# Patient Record
Sex: MALE | Race: OTHER | Employment: OTHER | URBAN - METROPOLITAN AREA
[De-identification: names, ages, dates, MRNs, and addresses within clinical notes are randomized per-mention and may not be internally consistent; named-entity substitution may affect disease eponyms.]

---

## 2017-01-04 ENCOUNTER — OFFICE VISIT (OUTPATIENT)
Dept: CARDIOLOGY | Facility: CLINIC | Age: 82
End: 2017-01-04
Payer: MEDICARE

## 2017-01-04 VITALS
HEIGHT: 67 IN | SYSTOLIC BLOOD PRESSURE: 159 MMHG | WEIGHT: 178.81 LBS | BODY MASS INDEX: 28.07 KG/M2 | DIASTOLIC BLOOD PRESSURE: 73 MMHG | HEART RATE: 71 BPM

## 2017-01-04 DIAGNOSIS — N18.4 CKD (CHRONIC KIDNEY DISEASE), STAGE IV: ICD-10-CM

## 2017-01-04 DIAGNOSIS — R06.02 SHORTNESS OF BREATH: Primary | ICD-10-CM

## 2017-01-04 DIAGNOSIS — I10 ESSENTIAL HYPERTENSION: ICD-10-CM

## 2017-01-04 DIAGNOSIS — I25.10 CORONARY ARTERY DISEASE INVOLVING NATIVE CORONARY ARTERY OF NATIVE HEART WITHOUT ANGINA PECTORIS: ICD-10-CM

## 2017-01-04 PROCEDURE — 99214 OFFICE O/P EST MOD 30 MIN: CPT | Mod: S$PBB,,, | Performed by: INTERNAL MEDICINE

## 2017-01-04 PROCEDURE — 99999 PR PBB SHADOW E&M-EST. PATIENT-LVL III: CPT | Mod: PBBFAC,,, | Performed by: INTERNAL MEDICINE

## 2017-01-04 PROCEDURE — 99213 OFFICE O/P EST LOW 20 MIN: CPT | Mod: PBBFAC,PO | Performed by: INTERNAL MEDICINE

## 2017-01-04 RX ORDER — CLONIDINE HYDROCHLORIDE 0.1 MG/1
TABLET, EXTENDED RELEASE ORAL
COMMUNITY
End: 2017-01-04 | Stop reason: DRUGHIGH

## 2017-01-04 RX ORDER — CYPROHEPTADINE HYDROCHLORIDE 4 MG/1
4 TABLET ORAL 2 TIMES DAILY PRN
Status: ON HOLD | COMMUNITY
End: 2019-11-20 | Stop reason: HOSPADM

## 2017-01-04 RX ORDER — CLONIDINE HYDROCHLORIDE 0.1 MG/1
0.1 TABLET ORAL 2 TIMES DAILY
Qty: 60 TABLET | Refills: 11 | Status: SHIPPED | OUTPATIENT
Start: 2017-01-04 | End: 2019-06-12

## 2017-01-04 NOTE — PROGRESS NOTES
Subjective:    Patient ID:  Chung Hubbard Sr. is a 89 y.o. male who presents for follow-up of CAD F/U and Hypertension      HPI Comments: Hiss BP is out of control.    Hypertension   This is a chronic problem. The current episode started more than 1 year ago. The problem has been gradually worsening since onset. Condition status: Partially controlled and spiking. Past treatments include angiotensin blockers, beta blockers, central alpha agonists, calcium channel blockers and diuretics. The current treatment provides moderate improvement.       Review of Systems   All other systems reviewed and are negative.       Objective:    Physical Exam   Constitutional: He is oriented to person, place, and time. He appears well-developed and well-nourished.   HENT:   Head: Normocephalic and atraumatic.   Eyes: Conjunctivae and EOM are normal. Pupils are equal, round, and reactive to light. Right eye exhibits no discharge. Left eye exhibits no discharge. No scleral icterus.   Neck: Normal range of motion. Neck supple. No JVD present. No tracheal deviation present. No thyromegaly present.   Cardiovascular: Normal rate, regular rhythm, intact distal pulses and normal pulses.  PMI is not displaced.    Murmur heard.   Early systolic murmur is present with a grade of 2/6  at the upper right sternal border  Pulses:       Carotid pulses are 2+ on the right side, and 2+ on the left side.       Dorsalis pedis pulses are 2+ on the right side, and 2+ on the left side.        Posterior tibial pulses are 2+ on the right side, and 2+ on the left side.   Pulmonary/Chest: Effort normal and breath sounds normal. No stridor. No respiratory distress. He has no wheezes. He has no rales.   Abdominal: Soft. Bowel sounds are normal. He exhibits no distension. There is no tenderness. There is no rebound and no guarding.   Musculoskeletal: Normal range of motion. He exhibits no edema or tenderness.   Lymphadenopathy:     He has no cervical adenopathy.    Neurological: He is alert and oriented to person, place, and time. He has normal reflexes.   Skin: Skin is warm and dry. No rash noted. No erythema. No pallor.   Psychiatric: He has a normal mood and affect. His behavior is normal. Judgment and thought content normal.         Assessment:       1. Shortness of breath    2. Coronary artery disease involving native coronary artery of native heart without angina pectoris    3. Essential hypertension    4. CKD (chronic kidney disease), stage IV         Plan:       Shortness of breath    Coronary artery disease involving native coronary artery of native heart without angina pectoris    Essential hypertension    CKD (chronic kidney disease), stage IV    Other orders  -     cloNIDine (CATAPRES) 0.1 MG tablet; Take 1 tablet (0.1 mg total) by mouth 2 (two) times daily.  Dispense: 60 tablet; Refill: 11    Will start with clonidine BID and next visit may discontinue metoprolol.

## 2017-03-21 ENCOUNTER — OFFICE VISIT (OUTPATIENT)
Dept: CARDIOLOGY | Facility: CLINIC | Age: 82
End: 2017-03-21
Payer: MEDICARE

## 2017-03-21 VITALS
BODY MASS INDEX: 27.78 KG/M2 | SYSTOLIC BLOOD PRESSURE: 163 MMHG | DIASTOLIC BLOOD PRESSURE: 73 MMHG | HEART RATE: 58 BPM | WEIGHT: 177 LBS | RESPIRATION RATE: 18 BRPM | HEIGHT: 67 IN

## 2017-03-21 DIAGNOSIS — R06.09 DYSPNEA ON EXERTION: ICD-10-CM

## 2017-03-21 DIAGNOSIS — I10 ESSENTIAL HYPERTENSION: Primary | ICD-10-CM

## 2017-03-21 DIAGNOSIS — I25.10 CORONARY ARTERY DISEASE INVOLVING NATIVE CORONARY ARTERY OF NATIVE HEART WITHOUT ANGINA PECTORIS: ICD-10-CM

## 2017-03-21 DIAGNOSIS — N18.4 CKD (CHRONIC KIDNEY DISEASE), STAGE IV: ICD-10-CM

## 2017-03-21 PROCEDURE — 99214 OFFICE O/P EST MOD 30 MIN: CPT | Mod: S$PBB,,, | Performed by: INTERNAL MEDICINE

## 2017-03-21 PROCEDURE — 99999 PR PBB SHADOW E&M-EST. PATIENT-LVL III: CPT | Mod: PBBFAC,,, | Performed by: INTERNAL MEDICINE

## 2017-03-21 PROCEDURE — 99213 OFFICE O/P EST LOW 20 MIN: CPT | Mod: PBBFAC,PO | Performed by: INTERNAL MEDICINE

## 2017-03-21 RX ORDER — ALBUTEROL SULFATE 90 UG/1
2 AEROSOL, METERED RESPIRATORY (INHALATION)
Status: DISCONTINUED | OUTPATIENT
Start: 2017-03-21 | End: 2017-06-05

## 2017-03-21 NOTE — PROGRESS NOTES
Subjective:    Patient ID:  Chung Hubbard Sr. is a 89 y.o. male who presents for follow-up of Hospital Follow Up (HTN, SOB)      HPI Comments: WAs admitted for one day for HBP and URI.      Review of Systems   All other systems reviewed and are negative.       Objective:    Physical Exam   Constitutional: He is oriented to person, place, and time. He appears well-developed and well-nourished.   HENT:   Head: Normocephalic and atraumatic.   Eyes: Conjunctivae and EOM are normal. Pupils are equal, round, and reactive to light. Right eye exhibits no discharge. Left eye exhibits no discharge. No scleral icterus.   Neck: Normal range of motion. Neck supple. No JVD present. No tracheal deviation present. No thyromegaly present.   Cardiovascular: Normal rate, regular rhythm, intact distal pulses and normal pulses.  PMI is not displaced.    Murmur heard.   Early systolic murmur is present with a grade of 2/6  at the upper right sternal border  Pulses:       Carotid pulses are 2+ on the right side, and 2+ on the left side.       Dorsalis pedis pulses are 2+ on the right side, and 2+ on the left side.        Posterior tibial pulses are 2+ on the right side, and 2+ on the left side.   Pulmonary/Chest: Effort normal. No stridor. No respiratory distress. He has no decreased breath sounds. He has no wheezes. He has rhonchi in the left lower field. He has no rales.   Abdominal: Soft. Bowel sounds are normal. He exhibits no distension. There is no tenderness. There is no rebound and no guarding.   Musculoskeletal: Normal range of motion. He exhibits no edema or tenderness.   Lymphadenopathy:     He has no cervical adenopathy.   Neurological: He is alert and oriented to person, place, and time. He has normal reflexes.   Skin: Skin is warm and dry. No rash noted. No erythema. No pallor.   Psychiatric: He has a normal mood and affect. His behavior is normal. Judgment and thought content normal.         Assessment:       1. Essential  hypertension    2. Coronary artery disease involving native coronary artery of native heart without angina pectoris    3. CKD (chronic kidney disease), stage IV    4. Dyspnea on exertion         Plan:       Essential hypertension    Coronary artery disease involving native coronary artery of native heart without angina pectoris    CKD (chronic kidney disease), stage IV    Dyspnea on exertion    Other orders  -     albuterol inhaler 2 puff; Inhale 2 puffs into the lungs one time.

## 2017-04-17 ENCOUNTER — TELEPHONE (OUTPATIENT)
Dept: CARDIOLOGY | Facility: CLINIC | Age: 82
End: 2017-04-17

## 2017-04-17 NOTE — TELEPHONE ENCOUNTER
----- Message from Adriana Gonsalez sent at 4/17/2017 11:10 AM CDT -----  Contact: Daughter  Ro  [phone]  Patient's daughter Ro  called and asked for a call back her father has [facility]. Daughter is trying to have her father moved to [facility]. Nicole Starr  is asking for a call back [phone]

## 2017-04-24 ENCOUNTER — OFFICE VISIT (OUTPATIENT)
Dept: CARDIOLOGY | Facility: CLINIC | Age: 82
End: 2017-04-24
Payer: MEDICARE

## 2017-04-24 VITALS
DIASTOLIC BLOOD PRESSURE: 75 MMHG | RESPIRATION RATE: 17 BRPM | OXYGEN SATURATION: 96 % | BODY MASS INDEX: 28.2 KG/M2 | HEART RATE: 73 BPM | HEIGHT: 67 IN | WEIGHT: 179.69 LBS | SYSTOLIC BLOOD PRESSURE: 143 MMHG

## 2017-04-24 DIAGNOSIS — R06.09 DYSPNEA ON EXERTION: Primary | ICD-10-CM

## 2017-04-24 DIAGNOSIS — I25.10 CORONARY ARTERY DISEASE INVOLVING NATIVE CORONARY ARTERY OF NATIVE HEART WITHOUT ANGINA PECTORIS: ICD-10-CM

## 2017-04-24 DIAGNOSIS — I10 ESSENTIAL HYPERTENSION: ICD-10-CM

## 2017-04-24 PROCEDURE — 99999 PR PBB SHADOW E&M-EST. PATIENT-LVL III: CPT | Mod: PBBFAC,,, | Performed by: INTERNAL MEDICINE

## 2017-04-24 PROCEDURE — 99214 OFFICE O/P EST MOD 30 MIN: CPT | Mod: S$PBB,,, | Performed by: INTERNAL MEDICINE

## 2017-04-24 PROCEDURE — 99213 OFFICE O/P EST LOW 20 MIN: CPT | Mod: PBBFAC,PO | Performed by: INTERNAL MEDICINE

## 2017-04-24 NOTE — PROGRESS NOTES
Subjective:    Patient ID:  Chung Hubbard Sr. is a 89 y.o. male who presents for follow-up of Follow-up      HPI Comments: Went to the Hospital for low urine output. After hydration he got better. His pressure was high.      Review of Systems   Constitution:        Sleepy.        Objective:    Physical Exam   Constitutional: He is oriented to person, place, and time. He appears well-developed and well-nourished.   HENT:   Head: Normocephalic and atraumatic.   Eyes: Conjunctivae and EOM are normal. Pupils are equal, round, and reactive to light. Right eye exhibits no discharge. Left eye exhibits no discharge. No scleral icterus.   Neck: Normal range of motion. Neck supple. No JVD present. No tracheal deviation present. No thyromegaly present.   Cardiovascular: Normal rate, regular rhythm, intact distal pulses and normal pulses.  PMI is not displaced.    Murmur heard.   Early systolic murmur is present with a grade of 2/6  at the upper right sternal border  Pulses:       Carotid pulses are 2+ on the right side, and 2+ on the left side.       Dorsalis pedis pulses are 2+ on the right side, and 2+ on the left side.        Posterior tibial pulses are 2+ on the right side, and 2+ on the left side.   Pulmonary/Chest: Effort normal. No stridor. No respiratory distress. He has no decreased breath sounds. He has no wheezes. He has rhonchi in the left lower field. He has no rales.   Abdominal: Soft. Bowel sounds are normal. He exhibits no distension. There is no tenderness. There is no rebound and no guarding.   Musculoskeletal: Normal range of motion. He exhibits no edema or tenderness.   Lymphadenopathy:     He has no cervical adenopathy.   Neurological: He is alert and oriented to person, place, and time. He has normal reflexes.   Skin: Skin is warm and dry. No rash noted. No erythema. No pallor.   Psychiatric: He has a normal mood and affect. His behavior is normal. Judgment and thought content normal.         Assessment:        1. Dyspnea on exertion    2. Essential hypertension    3. Coronary artery disease involving native coronary artery of native heart without angina pectoris         Plan:       Dyspnea on exertion    Essential hypertension    Coronary artery disease involving native coronary artery of native heart without angina pectoris    Makes 90 and still rolling!!! Advised to drink more fluids

## 2017-06-01 ENCOUNTER — TELEPHONE (OUTPATIENT)
Dept: CARDIOLOGY | Facility: CLINIC | Age: 82
End: 2017-06-01

## 2017-06-01 NOTE — TELEPHONE ENCOUNTER
----- Message from Greta Lyle sent at 6/1/2017  9:35 AM CDT -----  Contact: Ro/daughter  Ro called and stated that she is trying to get the patient placed in the hospital because he is suffering. She stated that he is having issues with his heart and breathing (that's been going on for quite a while) and she stated she has called in so many times. She can be contacted at [phone].    Thanks,  Greta

## 2017-06-01 NOTE — TELEPHONE ENCOUNTER
Spoke with the patients daughter. She was advised to take  to the ER due to SOB. She verbalized understanding and stated she was going to take him.

## 2017-07-25 ENCOUNTER — OFFICE VISIT (OUTPATIENT)
Dept: CARDIOLOGY | Facility: CLINIC | Age: 82
End: 2017-07-25
Payer: MEDICARE

## 2017-07-25 VITALS
HEART RATE: 73 BPM | RESPIRATION RATE: 17 BRPM | SYSTOLIC BLOOD PRESSURE: 147 MMHG | BODY MASS INDEX: 25.96 KG/M2 | HEIGHT: 68 IN | WEIGHT: 171.31 LBS | DIASTOLIC BLOOD PRESSURE: 62 MMHG

## 2017-07-25 DIAGNOSIS — R06.02 SHORTNESS OF BREATH: Primary | ICD-10-CM

## 2017-07-25 DIAGNOSIS — I25.10 CORONARY ARTERY DISEASE INVOLVING NATIVE CORONARY ARTERY OF NATIVE HEART WITHOUT ANGINA PECTORIS: ICD-10-CM

## 2017-07-25 DIAGNOSIS — N18.4 CKD (CHRONIC KIDNEY DISEASE), STAGE IV: ICD-10-CM

## 2017-07-25 DIAGNOSIS — I10 ESSENTIAL HYPERTENSION: ICD-10-CM

## 2017-07-25 PROCEDURE — 99999 PR PBB SHADOW E&M-EST. PATIENT-LVL III: CPT | Mod: PBBFAC,,, | Performed by: INTERNAL MEDICINE

## 2017-07-25 PROCEDURE — 1159F MED LIST DOCD IN RCRD: CPT | Mod: ,,, | Performed by: INTERNAL MEDICINE

## 2017-07-25 PROCEDURE — 99213 OFFICE O/P EST LOW 20 MIN: CPT | Mod: PBBFAC,PO | Performed by: INTERNAL MEDICINE

## 2017-07-25 PROCEDURE — 99214 OFFICE O/P EST MOD 30 MIN: CPT | Mod: S$PBB,,, | Performed by: INTERNAL MEDICINE

## 2017-07-25 NOTE — PROGRESS NOTES
Subjective:    Patient ID:  Chung Hubbard Sr. is a 89 y.o. male who presents for follow-up of No chief complaint on file.      Getting weaker with age. Walks about 100 feet a day. SOB when walking        Review of Systems   Constitution: Positive for weakness.   HENT: Negative.    Eyes: Negative.    Cardiovascular: Negative.    Respiratory: Positive for shortness of breath.    Endocrine: Negative.    Hematologic/Lymphatic: Negative.    Skin: Negative.    Musculoskeletal: Negative.    Gastrointestinal: Negative.    Genitourinary: Negative.    Psychiatric/Behavioral: Negative.    Allergic/Immunologic: Negative.         Objective:    Physical Exam   Constitutional: He is oriented to person, place, and time. He appears well-developed and well-nourished.   HENT:   Head: Normocephalic and atraumatic.   Eyes: Conjunctivae and EOM are normal. Pupils are equal, round, and reactive to light. Right eye exhibits no discharge. Left eye exhibits no discharge. No scleral icterus.   Neck: Normal range of motion. Neck supple. No JVD present. No tracheal deviation present. No thyromegaly present.   Cardiovascular: Normal rate, regular rhythm, intact distal pulses and normal pulses.  PMI is not displaced.    Murmur heard.   Early systolic murmur is present with a grade of 2/6  at the upper right sternal border  Pulses:       Carotid pulses are 2+ on the right side, and 2+ on the left side.       Dorsalis pedis pulses are 2+ on the right side, and 2+ on the left side.        Posterior tibial pulses are 2+ on the right side, and 2+ on the left side.   Pulmonary/Chest: Effort normal. No stridor. No respiratory distress. He has no decreased breath sounds. He has no wheezes. He has rhonchi in the left lower field. He has no rales.   Abdominal: Soft. Bowel sounds are normal. He exhibits no distension. There is no tenderness. There is no rebound and no guarding.   Musculoskeletal: Normal range of motion. He exhibits no edema or tenderness.    Lymphadenopathy:     He has no cervical adenopathy.   Neurological: He is alert and oriented to person, place, and time. He has normal reflexes.   Skin: Skin is warm and dry. No rash noted. No erythema. No pallor.   Psychiatric: He has a normal mood and affect. His behavior is normal. Judgment and thought content normal.         Assessment:       1. Shortness of breath    2. Essential hypertension    3. Coronary artery disease involving native coronary artery of native heart without angina pectoris    4. CKD (chronic kidney disease), stage IV         Plan:       Shortness of breath    Essential hypertension    Coronary artery disease involving native coronary artery of native heart without angina pectoris    CKD (chronic kidney disease), stage IV      Not very active walks little. Doesn't sleep well at night.

## 2017-08-16 DIAGNOSIS — R41.0 CONFUSION: Primary | ICD-10-CM

## 2017-09-11 PROBLEM — R63.4 WEIGHT LOSS, NON-INTENTIONAL: Status: ACTIVE | Noted: 2017-09-11

## 2017-09-11 PROBLEM — R06.02 SOB (SHORTNESS OF BREATH): Status: ACTIVE | Noted: 2017-09-11

## 2018-01-22 ENCOUNTER — TELEPHONE (OUTPATIENT)
Dept: CARDIOLOGY | Facility: CLINIC | Age: 83
End: 2018-01-22

## 2018-01-22 NOTE — TELEPHONE ENCOUNTER
----- Message from Debbie Aj sent at 1/22/2018  7:57 AM CST -----  FYI/Daughter (Ro)stated patient needed to rescheduled tomorrow's appointment due to patient has been feeling bad and having stomach issues/rescheduled to February.

## 2018-02-07 ENCOUNTER — OFFICE VISIT (OUTPATIENT)
Dept: CARDIOLOGY | Facility: CLINIC | Age: 83
End: 2018-02-07
Payer: MEDICARE

## 2018-02-07 VITALS
WEIGHT: 169.06 LBS | BODY MASS INDEX: 25.62 KG/M2 | DIASTOLIC BLOOD PRESSURE: 74 MMHG | HEART RATE: 64 BPM | SYSTOLIC BLOOD PRESSURE: 200 MMHG | HEIGHT: 68 IN

## 2018-02-07 DIAGNOSIS — N18.30 CHRONIC RENAL IMPAIRMENT, STAGE 3 (MODERATE): ICD-10-CM

## 2018-02-07 DIAGNOSIS — R06.09 DYSPNEA ON EXERTION: ICD-10-CM

## 2018-02-07 DIAGNOSIS — I10 ESSENTIAL HYPERTENSION: Primary | ICD-10-CM

## 2018-02-07 DIAGNOSIS — I25.10 CORONARY ARTERY DISEASE INVOLVING NATIVE CORONARY ARTERY OF NATIVE HEART WITHOUT ANGINA PECTORIS: ICD-10-CM

## 2018-02-07 PROCEDURE — 1159F MED LIST DOCD IN RCRD: CPT | Mod: ,,, | Performed by: INTERNAL MEDICINE

## 2018-02-07 PROCEDURE — 99999 PR PBB SHADOW E&M-EST. PATIENT-LVL III: CPT | Mod: PBBFAC,,, | Performed by: INTERNAL MEDICINE

## 2018-02-07 PROCEDURE — 99213 OFFICE O/P EST LOW 20 MIN: CPT | Mod: PBBFAC,PO | Performed by: INTERNAL MEDICINE

## 2018-02-07 PROCEDURE — 1126F AMNT PAIN NOTED NONE PRSNT: CPT | Mod: ,,, | Performed by: INTERNAL MEDICINE

## 2018-02-07 PROCEDURE — 99214 OFFICE O/P EST MOD 30 MIN: CPT | Mod: ,,, | Performed by: INTERNAL MEDICINE

## 2018-02-07 RX ORDER — TRAMADOL HYDROCHLORIDE 50 MG/1
50 TABLET ORAL EVERY 6 HOURS PRN
Qty: 30 TABLET | Refills: 0 | Status: SHIPPED | OUTPATIENT
Start: 2018-02-07 | End: 2018-02-17

## 2018-02-07 NOTE — PROGRESS NOTES
Subjective:    Patient ID:  Chung Hubbard Sr. is a 89 y.o. male who presents for follow-up of Hyperlipidemia (follow up); Hypertension; Anemia; Bronchitis; and Chronic Kidney Disease      Doing well. Complains of back pain that is worse and really hurts him to the point that he cannot sleep well on scale from 1/10 it is a 10.        Review of Systems   Respiratory: Positive for shortness of breath.    Musculoskeletal: Positive for back pain.   All other systems reviewed and are negative.       Objective:    Physical Exam   Constitutional: He is oriented to person, place, and time. He appears well-developed and well-nourished.   HENT:   Head: Normocephalic and atraumatic.   Eyes: Conjunctivae and EOM are normal. Pupils are equal, round, and reactive to light. Right eye exhibits no discharge. Left eye exhibits no discharge. No scleral icterus.   Neck: Normal range of motion. Neck supple. No JVD present. No tracheal deviation present. No thyromegaly present.   Cardiovascular: Normal rate, regular rhythm, intact distal pulses and normal pulses.  PMI is not displaced.    Murmur heard.   Early systolic murmur is present with a grade of 2/6  at the upper right sternal border  Pulses:       Carotid pulses are 2+ on the right side, and 2+ on the left side.       Dorsalis pedis pulses are 2+ on the right side, and 2+ on the left side.        Posterior tibial pulses are 2+ on the right side, and 2+ on the left side.   Pulmonary/Chest: Effort normal. No stridor. No respiratory distress. He has no decreased breath sounds. He has no wheezes. He has rhonchi in the left lower field. He has no rales.   Abdominal: Soft. Bowel sounds are normal. He exhibits no distension. There is no tenderness. There is no rebound and no guarding.   Musculoskeletal: Normal range of motion. He exhibits no edema or tenderness.   Lymphadenopathy:     He has no cervical adenopathy.   Neurological: He is alert and oriented to person, place, and time. He  has normal reflexes.   Skin: Skin is warm and dry. No rash noted. No erythema. No pallor.   Psychiatric: He has a normal mood and affect. His behavior is normal. Judgment and thought content normal.         Assessment:       1. Essential hypertension    2. Coronary artery disease involving native coronary artery of native heart without angina pectoris    3. Chronic renal impairment, stage 3 (moderate)    4. Dyspnea on exertion         Plan:       Essential hypertension    Coronary artery disease involving native coronary artery of native heart without angina pectoris    Chronic renal impairment, stage 3 (moderate)    Dyspnea on exertion    Other orders  -     traMADol (ULTRAM) 50 mg tablet; Take 1 tablet (50 mg total) by mouth every 6 (six) hours as needed for Pain.  Dispense: 30 tablet; Refill: 0    RTC in 4 months. Advised to take tramadol only and when he gets sever pain. He understood.

## 2018-06-07 ENCOUNTER — TELEPHONE (OUTPATIENT)
Dept: NEPHROLOGY | Facility: CLINIC | Age: 83
End: 2018-06-07

## 2018-06-07 NOTE — TELEPHONE ENCOUNTER
----- Message from Mark Quintanilla sent at 6/7/2018 11:54 AM CDT -----  Contact: [phone]/Spouse  Call TO confirm protocol to set appt with specialist . Pt having chronic kidney issues

## 2018-06-20 ENCOUNTER — OFFICE VISIT (OUTPATIENT)
Dept: CARDIOLOGY | Facility: CLINIC | Age: 83
End: 2018-06-20
Payer: MEDICARE

## 2018-06-20 VITALS
WEIGHT: 163.81 LBS | HEART RATE: 60 BPM | BODY MASS INDEX: 26.33 KG/M2 | DIASTOLIC BLOOD PRESSURE: 69 MMHG | HEIGHT: 66 IN | SYSTOLIC BLOOD PRESSURE: 170 MMHG

## 2018-06-20 DIAGNOSIS — R06.02 SOB (SHORTNESS OF BREATH): ICD-10-CM

## 2018-06-20 DIAGNOSIS — I10 ESSENTIAL HYPERTENSION: Primary | ICD-10-CM

## 2018-06-20 DIAGNOSIS — I25.10 CORONARY ARTERY DISEASE INVOLVING NATIVE CORONARY ARTERY OF NATIVE HEART WITHOUT ANGINA PECTORIS: ICD-10-CM

## 2018-06-20 PROCEDURE — 99999 PR PBB SHADOW E&M-EST. PATIENT-LVL III: CPT | Mod: PBBFAC,,, | Performed by: INTERNAL MEDICINE

## 2018-06-20 PROCEDURE — 99213 OFFICE O/P EST LOW 20 MIN: CPT | Mod: PBBFAC,PO | Performed by: INTERNAL MEDICINE

## 2018-06-20 PROCEDURE — 99214 OFFICE O/P EST MOD 30 MIN: CPT | Mod: S$PBB,,, | Performed by: INTERNAL MEDICINE

## 2018-06-20 RX ORDER — HYDRALAZINE HYDROCHLORIDE 10 MG/1
10 TABLET, FILM COATED ORAL EVERY 12 HOURS
Qty: 60 TABLET | Refills: 11 | Status: SHIPPED | OUTPATIENT
Start: 2018-06-20 | End: 2018-07-03 | Stop reason: ALTCHOICE

## 2018-06-20 NOTE — PROGRESS NOTES
Subjective:    Patient ID:  Chung Hubbard Sr. is a 89 y.o. male who presents for follow-up of Hypertension (4 month f/u ) and Tachycardia      Voices no major complains.        Review of Systems   Respiratory: Positive for shortness of breath.    All other systems reviewed and are negative.       Objective:    Physical Exam   Constitutional: He is oriented to person, place, and time. He appears well-developed and well-nourished.   HENT:   Head: Normocephalic and atraumatic.   Eyes: Conjunctivae and EOM are normal. Pupils are equal, round, and reactive to light. Right eye exhibits no discharge. Left eye exhibits no discharge. No scleral icterus.   Neck: Normal range of motion. Neck supple. No JVD present. No tracheal deviation present. No thyromegaly present.   Cardiovascular: Normal rate, regular rhythm, intact distal pulses and normal pulses.  PMI is not displaced.    Murmur heard.   Early systolic murmur is present with a grade of 2/6  at the upper right sternal border  Pulses:       Carotid pulses are 2+ on the right side, and 2+ on the left side.       Dorsalis pedis pulses are 2+ on the right side, and 2+ on the left side.        Posterior tibial pulses are 2+ on the right side, and 2+ on the left side.   Pulmonary/Chest: Effort normal. No stridor. No respiratory distress. He has no decreased breath sounds. He has no wheezes. He has rhonchi in the left lower field. He has no rales.   Abdominal: Soft. Bowel sounds are normal. He exhibits no distension. There is no tenderness. There is no rebound and no guarding.   Musculoskeletal: Normal range of motion. He exhibits no edema or tenderness.   Lymphadenopathy:     He has no cervical adenopathy.   Neurological: He is alert and oriented to person, place, and time. He has normal reflexes.   Skin: Skin is warm and dry. No rash noted. No erythema. No pallor.   Psychiatric: He has a normal mood and affect. His behavior is normal. Judgment and thought content normal.          Assessment:       1. Essential hypertension    2. Coronary artery disease involving native coronary artery of native heart without angina pectoris    3. SOB (shortness of breath)         Plan:       Essential hypertension    Coronary artery disease involving native coronary artery of native heart without angina pectoris    SOB (shortness of breath)    Other orders  -     hydrALAZINE (APRESOLINE) 10 MG tablet; Take 1 tablet (10 mg total) by mouth every 12 (twelve) hours.  Dispense: 60 tablet; Refill: 11    RTC in 5 months.

## 2018-06-28 ENCOUNTER — TELEPHONE (OUTPATIENT)
Dept: CARDIOLOGY | Facility: CLINIC | Age: 83
End: 2018-06-28

## 2018-06-28 RX ORDER — HYDRALAZINE HYDROCHLORIDE 50 MG/1
50 TABLET, FILM COATED ORAL 3 TIMES DAILY
COMMUNITY
End: 2018-07-03 | Stop reason: SDUPTHER

## 2018-06-28 NOTE — TELEPHONE ENCOUNTER
Pt has been on Hydralazine 50 mg since May by Dr. Sewell. Pt's daughter states the 50 mg has been helping. She is concerned that another rx from our office was called for 10mg for the patient. She would like to know if she is to discontinue the 10mg that [facility] has ready.    Pt to call back with information stating how often the patient is taking the medication.

## 2018-06-28 NOTE — TELEPHONE ENCOUNTER
----- Message from Sandra Deshpande sent at 6/28/2018 10:34 AM CDT -----  Contact: Amarjit pt's daughter  Amarjit is calling to speak to nurse in regards to a mix up with pt's Prescription for(hydrALAZINE (APRESOLINE) 10 MG tablet), Prescription was called in to pharmacy by two different doctors with to different milligrams , please call to advise   Call Back   Thanks

## 2018-07-03 ENCOUNTER — TELEPHONE (OUTPATIENT)
Dept: CARDIOLOGY | Facility: CLINIC | Age: 83
End: 2018-07-03

## 2018-07-03 NOTE — TELEPHONE ENCOUNTER
----- Message from Olga Wiseman sent at 7/3/2018  2:20 PM CDT -----  Call Amarjit  [phone]     About dosage on medication

## 2018-07-03 NOTE — TELEPHONE ENCOUNTER
----- Message from Vinay Chester sent at 7/3/2018  9:29 AM CDT -----  Contact: Dtr/Amarjit Ocasio called in and stated she was waiting on a call back from office about attached patient (dad) increase in his Rx for hydrALAZINE (APRESOLINE) 50 MG tablet.  Amarjit stated it was supposed to be increase & called in but has not heard back from anyone.    Amarjit's call back number is [phone]

## 2018-07-03 NOTE — TELEPHONE ENCOUNTER
Spoke with pt daughter Amarjit & advised her that hydralazine should be 50mg not 10mg. Pt daughter verbalized understanding.

## 2018-07-03 NOTE — TELEPHONE ENCOUNTER
Please call and inform the patient.       Message   Received: Today   Message Contents   MD Devi Huston LPN   Caller: Unspecified (5 days ago, 10:51 AM)             Cancel the 10 MG hydralazine prescrition. Continue with the 50 MG as it is. Dr LOVELACE    Previous Messages      ----- Message -----   From: Devi Jaramillo LPN   Sent: 6/28/2018  11:24 AM   To: Rigo Montes De Oca MD

## 2018-07-10 RX ORDER — HYDRALAZINE HYDROCHLORIDE 50 MG/1
50 TABLET, FILM COATED ORAL EVERY 12 HOURS
Qty: 60 TABLET | Refills: 4 | Status: SHIPPED | OUTPATIENT
Start: 2018-07-10 | End: 2018-08-15 | Stop reason: SDUPTHER

## 2018-08-21 RX ORDER — HYDRALAZINE HYDROCHLORIDE 50 MG/1
50 TABLET, FILM COATED ORAL EVERY 12 HOURS
Qty: 60 TABLET | Refills: 4 | Status: ON HOLD | OUTPATIENT
Start: 2018-08-21 | End: 2019-11-20 | Stop reason: HOSPADM

## 2018-08-31 ENCOUNTER — TELEPHONE (OUTPATIENT)
Dept: ADMINISTRATIVE | Facility: CLINIC | Age: 83
End: 2018-08-31

## 2018-11-13 ENCOUNTER — TELEPHONE (OUTPATIENT)
Dept: CARDIOLOGY | Facility: CLINIC | Age: 83
End: 2018-11-13

## 2018-11-13 ENCOUNTER — OFFICE VISIT (OUTPATIENT)
Dept: CARDIOLOGY | Facility: CLINIC | Age: 83
End: 2018-11-13
Payer: MEDICARE

## 2018-11-13 VITALS
WEIGHT: 168 LBS | BODY MASS INDEX: 27 KG/M2 | HEART RATE: 61 BPM | SYSTOLIC BLOOD PRESSURE: 198 MMHG | HEIGHT: 66 IN | DIASTOLIC BLOOD PRESSURE: 71 MMHG

## 2018-11-13 DIAGNOSIS — N18.4 CKD (CHRONIC KIDNEY DISEASE), STAGE IV: ICD-10-CM

## 2018-11-13 DIAGNOSIS — I10 ESSENTIAL HYPERTENSION: Primary | ICD-10-CM

## 2018-11-13 DIAGNOSIS — M54.50 LOW BACK PAIN, NON-SPECIFIC: ICD-10-CM

## 2018-11-13 DIAGNOSIS — I25.10 CORONARY ARTERY DISEASE INVOLVING NATIVE CORONARY ARTERY OF NATIVE HEART WITHOUT ANGINA PECTORIS: ICD-10-CM

## 2018-11-13 PROCEDURE — 99214 OFFICE O/P EST MOD 30 MIN: CPT | Mod: S$PBB,,, | Performed by: INTERNAL MEDICINE

## 2018-11-13 PROCEDURE — 99999 PR PBB SHADOW E&M-EST. PATIENT-LVL IV: CPT | Mod: PBBFAC,,, | Performed by: INTERNAL MEDICINE

## 2018-11-13 PROCEDURE — 99214 OFFICE O/P EST MOD 30 MIN: CPT | Mod: PBBFAC,PO | Performed by: INTERNAL MEDICINE

## 2018-11-13 RX ORDER — CARVEDILOL 6.25 MG/1
12.5 TABLET ORAL 2 TIMES DAILY
COMMUNITY
End: 2019-06-12 | Stop reason: SDUPTHER

## 2018-11-13 RX ORDER — IRBESARTAN 150 MG/1
150 TABLET ORAL NIGHTLY
Qty: 90 TABLET | Refills: 3 | Status: ON HOLD | OUTPATIENT
Start: 2018-11-13 | End: 2019-09-04 | Stop reason: HOSPADM

## 2018-11-13 NOTE — PROGRESS NOTES
Subjective:    Patient ID:  Chung Hubbard Sr. is a 89 y.o. male who presents for follow-up of Follow-up and Hypertension      Lower back pain, urinary retention.No cardiac complains.        Review of Systems   Musculoskeletal: Positive for back pain.   Genitourinary: Positive for incomplete emptying.   All other systems reviewed and are negative.       Objective:      Physical Exam   Constitutional: He is oriented to person, place, and time. He appears well-developed and well-nourished.   HENT:   Head: Normocephalic and atraumatic.   Eyes: Conjunctivae and EOM are normal. Pupils are equal, round, and reactive to light. Right eye exhibits no discharge. Left eye exhibits no discharge. No scleral icterus.   Neck: Normal range of motion. Neck supple. No JVD present. No tracheal deviation present. No thyromegaly present.   Cardiovascular: Normal rate, regular rhythm, intact distal pulses and normal pulses. PMI is not displaced.   Murmur heard.   Early systolic murmur is present with a grade of 2/6 at the upper right sternal border.  Pulses:       Carotid pulses are 2+ on the right side, and 2+ on the left side.       Dorsalis pedis pulses are 2+ on the right side, and 2+ on the left side.        Posterior tibial pulses are 2+ on the right side, and 2+ on the left side.   Pulmonary/Chest: Effort normal. No stridor. No respiratory distress. He has no decreased breath sounds. He has no wheezes. He has rhonchi in the left lower field. He has no rales.   Abdominal: Soft. Bowel sounds are normal. He exhibits no distension. There is no tenderness. There is no rebound and no guarding.   Musculoskeletal: Normal range of motion. He exhibits no edema or tenderness.   Lymphadenopathy:     He has no cervical adenopathy.   Neurological: He is alert and oriented to person, place, and time. He has normal reflexes.   Skin: Skin is warm and dry. No rash noted. No erythema. No pallor.   Psychiatric: He has a normal mood and affect. His  behavior is normal. Judgment and thought content normal.         Assessment:       1. Essential hypertension    2. Coronary artery disease involving native coronary artery of native heart without angina pectoris    3. CKD (chronic kidney disease), stage IV    4. Low back pain, non-specific         Plan:       Essential hypertension    Coronary artery disease involving native coronary artery of native heart without angina pectoris    CKD (chronic kidney disease), stage IV    Low back pain, non-specific  -     X-Ray Lumbar Spine Complete 5 View; Future; Expected date: 11/13/2018    REFER back to urology. Stable cardiac wise

## 2018-11-13 NOTE — TELEPHONE ENCOUNTER
----- Message from Devan Coleman sent at 11/13/2018  1:02 PM CST -----  Contact: Jairo Starr called, she has some questions regarding patient xray, please call back at [phone]

## 2018-11-15 ENCOUNTER — TELEPHONE (OUTPATIENT)
Dept: CARDIOLOGY | Facility: CLINIC | Age: 83
End: 2018-11-15

## 2018-11-15 NOTE — TELEPHONE ENCOUNTER
----- Message from Ekaterina Gilmore sent at 11/15/2018  9:16 AM CST -----  Type: Needs Medical Advice    Who Called:  Daughter- Ro Jacobson  Symptoms (please be specific):  NA How long has patient had these symptoms:    Pharmacy name and phone #:  NA   Best Call Back Number: [phone]  Additional Information: Patient's daughter want patient to have x-ray done today at [facility]. Patent's daughter called asking for the orders or x-ray for the patient to be faxed today  [phone]. Please inform the daughter when orders for x-ray has been faxed.

## 2018-11-16 ENCOUNTER — TELEPHONE (OUTPATIENT)
Dept: CARDIOLOGY | Facility: CLINIC | Age: 83
End: 2018-11-16

## 2018-11-16 DIAGNOSIS — M54.5 LOW BACK PAIN, UNSPECIFIED BACK PAIN LATERALITY, UNSPECIFIED CHRONICITY, WITH SCIATICA PRESENCE UNSPECIFIED: Primary | ICD-10-CM

## 2018-11-16 NOTE — TELEPHONE ENCOUNTER
----- Message from Hitesh Caicedo sent at 11/16/2018  8:37 AM CST -----  Type:  Patient Returning Call    Who Called:  Patients daughter ajay   Who Left Message for Patient:  nurse  Does the patient know what this is regarding?: urgent   Best Call Back Number:  [phone]

## 2018-11-16 NOTE — TELEPHONE ENCOUNTER
----- Message from Ekaterina Gilmore sent at 11/16/2018  8:07 AM CST -----  Type: Needs Medical Advice    Who Called:  Daughter- Merced Nelson  Symptoms (please be specific):  NA  How long has patient had these symptoms:  NA  Pharmacy name and phone #:  [email or web address]  Best Call Back Number: [phone]  Additional Information: [facility] has not received orders for x-ray for the above listed patient.

## 2018-11-16 NOTE — TELEPHONE ENCOUNTER
----- Message from Ekaterina Gilmore sent at 11/16/2018  8:53 AM CST -----  Type: Needs Medical Advice    Who Called:  [facility]   Symptoms (please be specific):  NA  How long has patient had these symptoms:  NA  Pharmacy name and phone #:  NA Best Call Back Number: [phone]  Additional Information: The office has not received a signed order from the doctor for orders for an x-ray. The fax was a print out of the order,  There is no electronic signature, call was placed to the pod.

## 2018-11-16 NOTE — TELEPHONE ENCOUNTER
Advised patient daughter that orders have been faxed to [facility]. Patient daughter verbalized understanding.

## 2018-12-27 ENCOUNTER — TELEPHONE (OUTPATIENT)
Dept: CARDIOLOGY | Facility: CLINIC | Age: 83
End: 2018-12-27

## 2018-12-27 NOTE — TELEPHONE ENCOUNTER
----- Message from Elida Limon sent at 12/27/2018  4:02 PM CST -----  Contact: Amarjit Cárdenas, daughter  Type: Needs Medical Advice    Who Called:  praful Ocasio  Best Call Back Number: [phone]  Additional Information: Daughter wants to know if he has had an EKG here within the last 6 months--if so, we need to get the report to Dr. Cardoso before eye mass removal for 1/10/19--phone number for Dr. Cardoso is [phone], ask for Keiko or Nicolasa--please advise--thank you

## 2019-01-04 ENCOUNTER — TELEPHONE (OUTPATIENT)
Dept: CARDIOLOGY | Facility: CLINIC | Age: 84
End: 2019-01-04

## 2019-06-07 ENCOUNTER — TELEPHONE (OUTPATIENT)
Dept: CARDIOLOGY | Facility: CLINIC | Age: 84
End: 2019-06-07

## 2019-06-07 NOTE — TELEPHONE ENCOUNTER
----- Message from Gely Merchant sent at 6/7/2019 12:17 PM CDT -----  Contact: patient's daughter Amarjit  Says she would like her fathers appointment put on 6.17.19    She would like a call back at  [phone]    Thanks  KB

## 2019-06-12 ENCOUNTER — OFFICE VISIT (OUTPATIENT)
Dept: CARDIOLOGY | Facility: CLINIC | Age: 84
End: 2019-06-12
Payer: MEDICARE

## 2019-06-12 VITALS
DIASTOLIC BLOOD PRESSURE: 87 MMHG | SYSTOLIC BLOOD PRESSURE: 160 MMHG | HEART RATE: 70 BPM | BODY MASS INDEX: 25.23 KG/M2 | HEIGHT: 66 IN | WEIGHT: 157 LBS

## 2019-06-12 DIAGNOSIS — I25.10 CORONARY ARTERY DISEASE INVOLVING NATIVE CORONARY ARTERY OF NATIVE HEART WITHOUT ANGINA PECTORIS: ICD-10-CM

## 2019-06-12 DIAGNOSIS — R06.02 SHORTNESS OF BREATH: Primary | ICD-10-CM

## 2019-06-12 DIAGNOSIS — I10 ESSENTIAL HYPERTENSION: ICD-10-CM

## 2019-06-12 PROCEDURE — 99214 OFFICE O/P EST MOD 30 MIN: CPT | Mod: S$PBB,,, | Performed by: INTERNAL MEDICINE

## 2019-06-12 PROCEDURE — 99213 OFFICE O/P EST LOW 20 MIN: CPT | Mod: PBBFAC,PO | Performed by: INTERNAL MEDICINE

## 2019-06-12 PROCEDURE — 99999 PR PBB SHADOW E&M-EST. PATIENT-LVL III: ICD-10-PCS | Mod: PBBFAC,,, | Performed by: INTERNAL MEDICINE

## 2019-06-12 PROCEDURE — 99214 PR OFFICE/OUTPT VISIT, EST, LEVL IV, 30-39 MIN: ICD-10-PCS | Mod: S$PBB,,, | Performed by: INTERNAL MEDICINE

## 2019-06-12 PROCEDURE — 99999 PR PBB SHADOW E&M-EST. PATIENT-LVL III: CPT | Mod: PBBFAC,,, | Performed by: INTERNAL MEDICINE

## 2019-06-12 RX ORDER — FERROUS SULFATE 325(65) MG
325 TABLET ORAL DAILY
COMMUNITY

## 2019-06-12 RX ORDER — CARVEDILOL 12.5 MG/1
12.5 TABLET ORAL 2 TIMES DAILY
Qty: 60 TABLET | Refills: 11 | Status: SHIPPED | OUTPATIENT
Start: 2019-06-12 | End: 2019-08-12 | Stop reason: SDUPTHER

## 2019-06-12 RX ORDER — CARVEDILOL 6.25 MG/1
12.5 TABLET ORAL 2 TIMES DAILY
Qty: 60 TABLET | Refills: 11 | Status: SHIPPED | OUTPATIENT
Start: 2019-06-12 | End: 2019-06-12

## 2019-06-12 RX ORDER — LANOLIN ALCOHOL/MO/W.PET/CERES
500 CREAM (GRAM) TOPICAL DAILY
COMMUNITY

## 2019-06-12 RX ORDER — CETIRIZINE HYDROCHLORIDE 10 MG/1
5 TABLET ORAL 2 TIMES DAILY
COMMUNITY

## 2019-06-12 RX ORDER — LIDOCAINE 50 MG/G
1 PATCH TOPICAL DAILY
COMMUNITY

## 2019-06-12 NOTE — PROGRESS NOTES
Subjective:    Patient ID:  Chung Hubbard Sr. is a 89 y.o. male who presents for follow-up of Hypertension (follow up )      Doing well.      Review of Systems   Musculoskeletal: Positive for back pain.   Genitourinary: Positive for incomplete emptying.   All other systems reviewed and are negative.       Objective:      Physical Exam   Constitutional: He is oriented to person, place, and time. He appears well-developed and well-nourished.   HENT:   Head: Normocephalic and atraumatic.   Eyes: Pupils are equal, round, and reactive to light. Conjunctivae and EOM are normal. Right eye exhibits no discharge. Left eye exhibits no discharge. No scleral icterus.   Neck: Normal range of motion. Neck supple. No JVD present. No tracheal deviation present. No thyromegaly present.   Cardiovascular: Normal rate, regular rhythm, intact distal pulses and normal pulses. PMI is not displaced.   Murmur heard.   Early systolic murmur is present with a grade of 2/6 at the upper right sternal border.  Pulses:       Carotid pulses are 2+ on the right side, and 2+ on the left side.       Dorsalis pedis pulses are 2+ on the right side, and 2+ on the left side.        Posterior tibial pulses are 2+ on the right side, and 2+ on the left side.   Pulmonary/Chest: Effort normal. No stridor. No respiratory distress. He has no decreased breath sounds. He has no wheezes. He has rhonchi in the left lower field. He has no rales.   Abdominal: Soft. Bowel sounds are normal. He exhibits no distension. There is no tenderness. There is no rebound and no guarding.   Musculoskeletal: Normal range of motion. He exhibits no edema or tenderness.   Lymphadenopathy:     He has no cervical adenopathy.   Neurological: He is alert and oriented to person, place, and time. He has normal reflexes.   Skin: Skin is warm and dry. No rash noted. No erythema. No pallor.   Psychiatric: He has a normal mood and affect. His behavior is normal. Judgment and thought content  normal.         Assessment:       1. Shortness of breath    2. Essential hypertension    3. Coronary artery disease involving native coronary artery of native heart without angina pectoris         Plan:       Shortness of breath    Essential hypertension    Coronary artery disease involving native coronary artery of native heart without angina pectoris    Other orders  -     Discontinue: carvedilol (COREG) 6.25 MG tablet; Take 2 tablets (12.5 mg total) by mouth 2 (two) times daily.  Dispense: 60 tablet; Refill: 11  -     carvedilol (COREG) 12.5 MG tablet; Take 1 tablet (12.5 mg total) by mouth 2 (two) times daily.  Dispense: 60 tablet; Refill: 11    RTC in 3 weeks. a

## 2019-07-01 ENCOUNTER — TELEPHONE (OUTPATIENT)
Dept: CARDIOLOGY | Facility: CLINIC | Age: 84
End: 2019-07-01

## 2019-07-01 NOTE — TELEPHONE ENCOUNTER
----- Message from Elida Limon sent at 7/1/2019  8:49 AM CDT -----  Contact: Yajaira Rivera  Type: Needs Medical Advice    Who Called:  Yajaira Rivera  Best Call Back Number: [phone]  Additional Information: patient was supposed to have appt tomorrow 7/2 but daughter states he was seen a couple weeks ago but she didn't bring him to the appt--daughter wants to know who brought him to the appt as she is usually the only one that does this--they also usually come & see Dr. Montes De Oca together & is upset that the appt was R/S without her knowledge--please advise--thank you

## 2019-07-09 ENCOUNTER — OFFICE VISIT (OUTPATIENT)
Dept: CARDIOLOGY | Facility: CLINIC | Age: 84
End: 2019-07-09
Payer: MEDICARE

## 2019-07-09 VITALS
DIASTOLIC BLOOD PRESSURE: 71 MMHG | BODY MASS INDEX: 25.22 KG/M2 | SYSTOLIC BLOOD PRESSURE: 191 MMHG | WEIGHT: 156.94 LBS | HEIGHT: 66 IN | HEART RATE: 62 BPM

## 2019-07-09 DIAGNOSIS — M54.50 CHRONIC BILATERAL LOW BACK PAIN WITHOUT SCIATICA: ICD-10-CM

## 2019-07-09 DIAGNOSIS — G89.29 CHRONIC BILATERAL LOW BACK PAIN WITHOUT SCIATICA: ICD-10-CM

## 2019-07-09 DIAGNOSIS — I25.10 CORONARY ARTERY DISEASE INVOLVING NATIVE CORONARY ARTERY OF NATIVE HEART WITHOUT ANGINA PECTORIS: ICD-10-CM

## 2019-07-09 DIAGNOSIS — N18.4 CKD (CHRONIC KIDNEY DISEASE), STAGE IV: ICD-10-CM

## 2019-07-09 DIAGNOSIS — I10 ESSENTIAL HYPERTENSION: Primary | ICD-10-CM

## 2019-07-09 PROCEDURE — 99213 OFFICE O/P EST LOW 20 MIN: CPT | Mod: PBBFAC,PO | Performed by: INTERNAL MEDICINE

## 2019-07-09 PROCEDURE — 99214 OFFICE O/P EST MOD 30 MIN: CPT | Mod: S$PBB,,, | Performed by: INTERNAL MEDICINE

## 2019-07-09 PROCEDURE — 99999 PR PBB SHADOW E&M-EST. PATIENT-LVL III: ICD-10-PCS | Mod: PBBFAC,,, | Performed by: INTERNAL MEDICINE

## 2019-07-09 PROCEDURE — 99999 PR PBB SHADOW E&M-EST. PATIENT-LVL III: CPT | Mod: PBBFAC,,, | Performed by: INTERNAL MEDICINE

## 2019-07-09 PROCEDURE — 99214 PR OFFICE/OUTPT VISIT, EST, LEVL IV, 30-39 MIN: ICD-10-PCS | Mod: S$PBB,,, | Performed by: INTERNAL MEDICINE

## 2019-07-09 RX ORDER — CARVEDILOL 25 MG/1
25 TABLET ORAL 2 TIMES DAILY WITH MEALS
Qty: 60 TABLET | Refills: 11 | Status: SHIPPED | OUTPATIENT
Start: 2019-07-09 | End: 2020-07-08

## 2019-07-09 NOTE — PROGRESS NOTES
Subjective:    Patient ID:  Chung Hubbard Sr. is a 89 y.o. male who presents for follow-up of Follow-up (on b/p medication increase)      Somnolent. Having lots of back pains and occasional non cardiac chest wall pain. When pain is high BP is high.       Review of Systems   Musculoskeletal: Positive for arthritis and back pain.   All other systems reviewed and are negative.       Objective:      Physical Exam   Constitutional: He is oriented to person, place, and time. He appears well-developed and well-nourished.   HENT:   Head: Normocephalic and atraumatic.   Eyes: Pupils are equal, round, and reactive to light. Conjunctivae and EOM are normal. Right eye exhibits no discharge. Left eye exhibits no discharge. No scleral icterus.   Neck: Normal range of motion. Neck supple. No JVD present. No tracheal deviation present. No thyromegaly present.   Cardiovascular: Normal rate, regular rhythm, intact distal pulses and normal pulses. PMI is not displaced.   Murmur heard.   Early systolic murmur is present with a grade of 2/6 at the upper right sternal border.  Pulses:       Carotid pulses are 2+ on the right side, and 2+ on the left side.       Dorsalis pedis pulses are 2+ on the right side, and 2+ on the left side.        Posterior tibial pulses are 2+ on the right side, and 2+ on the left side.   Pulmonary/Chest: Effort normal. No stridor. No respiratory distress. He has no decreased breath sounds. He has no wheezes. He has rhonchi in the left lower field. He has no rales.   Abdominal: Soft. Bowel sounds are normal. He exhibits no distension. There is no tenderness. There is no rebound and no guarding.   Musculoskeletal: Normal range of motion. He exhibits no edema or tenderness.   Lymphadenopathy:     He has no cervical adenopathy.   Neurological: He is alert and oriented to person, place, and time. He has normal reflexes.   Skin: Skin is warm and dry. No rash noted. No erythema. No pallor.   Psychiatric: He has a  normal mood and affect. His behavior is normal. Judgment and thought content normal.         Assessment:       1. Essential hypertension    2. Coronary artery disease involving native coronary artery of native heart without angina pectoris    3. CKD (chronic kidney disease), stage IV    4. Chronic bilateral low back pain without sciatica         Plan:       Essential hypertension    Coronary artery disease involving native coronary artery of native heart without angina pectoris    CKD (chronic kidney disease), stage IV    Chronic bilateral low back pain without sciatica    Other orders  -     carvedilol (COREG) 25 MG tablet; Take 1 tablet (25 mg total) by mouth 2 (two) times daily with meals.  Dispense: 60 tablet; Refill: 11    Will refer to Pain management clinic. Increase carvedilol to 25 mg in AM and 12.5 mg in PM. RTC in 1 month.

## 2019-08-07 ENCOUNTER — OFFICE VISIT (OUTPATIENT)
Dept: CARDIOLOGY | Facility: CLINIC | Age: 84
End: 2019-08-07
Payer: MEDICARE

## 2019-08-07 VITALS
HEART RATE: 58 BPM | SYSTOLIC BLOOD PRESSURE: 136 MMHG | HEIGHT: 66 IN | DIASTOLIC BLOOD PRESSURE: 52 MMHG | BODY MASS INDEX: 25.54 KG/M2 | WEIGHT: 158.94 LBS | OXYGEN SATURATION: 97 %

## 2019-08-07 DIAGNOSIS — I25.10 CORONARY ARTERY DISEASE INVOLVING NATIVE CORONARY ARTERY OF NATIVE HEART WITHOUT ANGINA PECTORIS: ICD-10-CM

## 2019-08-07 DIAGNOSIS — I35.0 NONRHEUMATIC AORTIC VALVE STENOSIS: ICD-10-CM

## 2019-08-07 DIAGNOSIS — E78.00 HYPERCHOLESTEREMIA: ICD-10-CM

## 2019-08-07 DIAGNOSIS — I10 ESSENTIAL HYPERTENSION: Primary | ICD-10-CM

## 2019-08-07 DIAGNOSIS — I47.19 PAROXYSMAL ATRIAL TACHYCARDIA: ICD-10-CM

## 2019-08-07 PROCEDURE — 99204 PR OFFICE/OUTPT VISIT, NEW, LEVL IV, 45-59 MIN: ICD-10-PCS | Mod: S$GLB,,, | Performed by: INTERNAL MEDICINE

## 2019-08-07 PROCEDURE — 99204 OFFICE O/P NEW MOD 45 MIN: CPT | Mod: S$GLB,,, | Performed by: INTERNAL MEDICINE

## 2019-08-07 RX ORDER — PRAVASTATIN SODIUM 10 MG/1
10 TABLET ORAL DAILY
Qty: 90 TABLET | Refills: 1 | Status: SHIPPED | OUTPATIENT
Start: 2019-08-07 | End: 2019-08-07 | Stop reason: SDUPTHER

## 2019-08-07 RX ORDER — DEXTROMETHORPHAN HYDROBROMIDE, GUAIFENESIN 5; 100 MG/5ML; MG/5ML
650 LIQUID ORAL EVERY 6 HOURS PRN
COMMUNITY

## 2019-08-07 RX ORDER — PRAVASTATIN SODIUM 10 MG/1
10 TABLET ORAL DAILY
Qty: 90 TABLET | Refills: 1 | Status: SHIPPED | OUTPATIENT
Start: 2019-08-07 | End: 2020-08-06

## 2019-08-07 NOTE — PROGRESS NOTES
Subjective:    Patient ID:  Chung Hubbard Sr. is a 89 y.o. male who presents for Hypertension and Coronary Artery Disease        HPI  PATIENT WAS REFERRED BY DR. HYLTON,, RECORDS REVIEWED RECENT CR 1.92, NO CHEST PAIN PER SE NO TIA TYPE SYMPTOM NO SIGNIFICANT SHORTNESS OF BREATH, NO NEAR SYNCOPE, SEE REVIEW OF SYSTEMS    Past Medical History:   Diagnosis Date    Anemia     Arthritis     Bowel obstruction     Chronic renal failure     CKD (chronic kidney disease), stage IV     Diverticulitis     GERD (gastroesophageal reflux disease)     Hyperlipemia     Hypertension     Paroxysmal atrial tachycardia     Prostate troubles     Mendosa-Stevie syndrome      Past Surgical History:   Procedure Laterality Date    APPENDECTOMY      BIOPSY-BONE MARROW N/A 2018    Performed by [facility]ill[facility] CATH    CARDIAC CATHETERIZATION      last week    EYE SURGERY      HERNIA REPAIR      bilateral inguinal    KNEE SURGERY       Family History   Problem Relation Age of Onset    Anemia Mother     Heart disease Mother     Hypertension Mother     Anemia Father     Heart disease Father     Hypertension Father     Hyperlipidemia Sister     Hypertension Sister     Anemia Brother     Heart failure Brother     Hypertension Brother      Social History     Socioeconomic History    Marital status:      Spouse name: Not on file    Number of children: Not on file    Years of education: Not on file    Highest education level: Not on file   Occupational History    Not on file   Social Needs    Financial resource strain: Not on file    Food insecurity:     Worry: Not on file     Inability: Not on file    Transportation needs:     Medical: Not on file     Non-medical: Not on file   Tobacco Use    Smoking status: Former Smoker     Packs/day: 2.00     Types: Cigarettes     Last attempt to quit: 1962     Years since quittin.6    Smokeless tobacco: Former User   Substance and  "Sexual Activity    Alcohol use: No    Drug use: Not on file    Sexual activity: Not on file   Lifestyle    Physical activity:     Days per week: Not on file     Minutes per session: Not on file    Stress: Not on file   Relationships    Social connections:     Talks on phone: Not on file     Gets together: Not on file     Attends [employer] service: Not on file     Active member of club or organization: Not on file     Attends meetings of clubs or organizations: Not on file     Relationship status: Not on file   Other Topics Concern    Not on file   Social History Narrative    Not on file       Review of patient's allergies indicates:   Allergen Reactions    Beta-blockers (beta-adrenergic blocking agts)      Unknown specific medicine "some beta-blocker have me climbing the walls"    Percocet [oxycodone-acetaminophen]        Current Outpatient Medications:     acetaminophen (TYLENOL) 650 MG TbSR, Take 650 mg by mouth every 8 (eight) hours. Alt with Hydrocodone, Disp: , Rfl:     albuterol (ACCUNEB) 1.25 mg/3 mL Nebu, Take 2.5 mg by nebulization every 6 (six) hours as needed. Rescue, Disp: , Rfl:     allopurinol (ZYLOPRIM) 100 MG tablet, Take 100 mg by mouth once daily.  , Disp: , Rfl:     alprazolam (XANAX) 0.5 MG tablet, Take 0.5 mg by mouth nightly as needed for Anxiety., Disp: , Rfl:     ascorbic Acid (VITAMIN C) 500 mg CpSR, 500 mg., Disp: , Rfl:     aspirin (ECOTRIN) 81 MG EC tablet, Take 81 mg by mouth once daily., Disp: , Rfl:     budesonide-formoterol 160-4.5 mcg (SYMBICORT) 160-4.5 mcg/actuation HFAA, Inhale 2 puffs into the lungs every 12 (twelve) hours. Controller, Disp: , Rfl:     carvedilol (COREG) 12.5 MG tablet, Take 1 tablet (12.5 mg total) by mouth 2 (two) times daily., Disp: 60 tablet, Rfl: 11    carvedilol (COREG) 25 MG tablet, Take 1 tablet (25 mg total) by mouth 2 (two) times daily with meals., Disp: 60 tablet, Rfl: 11    cetirizine (ZYRTEC) 10 MG tablet, Take 10 mg by mouth once " daily., Disp: , Rfl:     cyproheptadine (PERIACTIN) 4 mg tablet, Take 4 mg by mouth 2 (two) times daily as needed., Disp: , Rfl:     dicyclomine (BENTYL) 10 MG capsule, Take 10 mg by mouth 4 (four) times daily as needed., Disp: , Rfl:     docusate sodium (COLACE) 100 MG capsule, Take 100 mg by mouth every evening., Disp: , Rfl:     donepezil (ARICEPT ODT) 5 MG TbDL, Take 5 mg by mouth nightly., Disp: , Rfl:     epoetin eladia (PROCRIT) 10,000 unit/mL injection, Inject 25,000 Units into the skin every 7 days., Disp: , Rfl:     ferrous gluconate (FERGON) 325 MG Tab, Take 325 mg by mouth 2 (two) times daily., Disp: , Rfl:     ferrous sulfate (FEOSOL) 325 mg (65 mg iron) Tab tablet, ferrous sulfate 325 mg (65 mg iron) tablet  Take 1 tablet twice a day by oral route., Disp: , Rfl:     fish oil-omega-3 fatty acids 300-1,000 mg capsule, Take 2 g by mouth once daily., Disp: , Rfl:     furosemide (LASIX) 40 MG tablet, Take 40 mg by mouth daily as needed. , Disp: , Rfl:     hydrALAZINE (APRESOLINE) 50 MG tablet, Take 1 tablet (50 mg total) by mouth every 12 (twelve) hours. May take an extra dose of 50mg in early afternoon for sbp>145 (Patient taking differently: Take 100 mg by mouth every 12 (twelve) hours. May take an extra dose of 50mg in early afternoon for sbp>145), Disp: 60 tablet, Rfl: 4    hydrocodone-acetaminophen 5-325mg (NORCO) 5-325 mg per tablet, Take 1 tablet by mouth every 12 (twelve) hours as needed for Pain., Disp: , Rfl:     irbesartan (AVAPRO) 150 MG tablet, Take 1 tablet (150 mg total) by mouth every evening. (Patient taking differently: Take 300 mg by mouth every evening. ), Disp: 90 tablet, Rfl: 3    isosorbide mononitrate (IMDUR) 30 MG 24 hr tablet, Take 1 tablet (30 mg total) by mouth once daily. (Patient taking differently: Take 30 mg by mouth 2 (two) times daily. ), Disp: 30 tablet, Rfl: 3    lidocaine (LIDODERM) 5 %, lidocaine 5 % topical patch  APPLY 1 PATCH BY TRANSDERMAL ROUTE ONCE  DAILY (MAY WEAR UP TO 12HOURS.), Disp: , Rfl:     multivitamin capsule, Take 1 capsule by mouth once daily., Disp: , Rfl:     nifedipine (PROCARDIA-XL) 90 MG (OSM) TR24, Take 1 tablet (90 mg total) by mouth every evening. (Patient taking differently: Take 90 mg by mouth once daily. 3pm), Disp: 30 tablet, Rfl: 1    omalizumab (XOLAIR) 150 mg injection, 1.2 mLs., Disp: , Rfl:     potassium chloride (MICRO-K) 10 MEQ CpSR, Take 10 mEq by mouth once daily., Disp: , Rfl:     ranitidine (ZANTAC) 150 MG tablet, Take 150 mg by mouth 2 (two) times daily. For itch, Disp: , Rfl:     sodium bicarbonate 650 MG tablet, Take 325 mg by mouth 3 (three) times daily. , Disp: , Rfl:     tamsulosin (FLOMAX) 0.4 mg Cp24, Take 0.4 mg by mouth every evening.  , Disp: , Rfl:     pravastatin (PRAVACHOL) 10 MG tablet, Take 1 tablet (10 mg total) by mouth once daily., Disp: 90 tablet, Rfl: 1    Review of Systems   Constitution: Negative for chills, diaphoresis, malaise/fatigue and night sweats.   HENT: Negative for congestion and nosebleeds.    Eyes: Negative for blurred vision and visual disturbance.   Cardiovascular: Negative for chest pain, claudication, cyanosis, dyspnea on exertion (MINIMAL), irregular heartbeat, leg swelling (MILD), near-syncope, orthopnea, palpitations, paroxysmal nocturnal dyspnea and syncope.   Respiratory: Negative for cough, hemoptysis, shortness of breath and wheezing.    Endocrine: Negative for cold intolerance, heat intolerance and polyuria.   Hematologic/Lymphatic: Negative for adenopathy. Does not bruise/bleed easily.   Skin: Negative for color change and itching.   Musculoskeletal: Positive for arthritis ( SOME). Negative for back pain ([facility]) and falls.   Gastrointestinal: Negative for abdominal pain, change in bowel habit, dysphagia, hematemesis, melena and vomiting.   Genitourinary: Negative for dysuria, flank pain and frequency.   Neurological: Negative for brief paralysis, dizziness, focal  "weakness, light-headedness, loss of balance, numbness, tremors and weakness.   Psychiatric/Behavioral: Negative for altered mental status and depression.   Allergic/Immunologic: Negative for hives and persistent infections.        Objective:      Vitals:    08/07/19 1025   BP: (!) 136/52   Pulse: (!) 58   SpO2: 97%   Weight: 72.1 kg (158 lb 15.2 oz)   Height: 5' 6" (1.676 m)   PainSc: 10-Worst pain ever     Body mass index is 25.66 kg/m².    Physical Exam   Constitutional: He is oriented to person, place, and time. He appears well-developed and well-nourished. He is active.   HENT:   Head: Normocephalic and atraumatic.   Mouth/Throat: Oropharynx is clear and moist and mucous membranes are normal.   Eyes: Pupils are equal, round, and reactive to light. Conjunctivae and EOM are normal.   Neck: Normal range of motion. Neck supple. Normal carotid pulses, no hepatojugular reflux and no JVD present. Carotid bruit is not present. No tracheal deviation, no edema and no erythema present. No thyromegaly present.   Cardiovascular: Normal rate and regular rhythm.  No extrasystoles are present. PMI is not displaced. Exam reveals no gallop, no distant heart sounds, no friction rub and no midsystolic click.   Murmur heard.   Harsh midsystolic murmur is present with a grade of 1/6 radiating to the neck.  Pulses:       Carotid pulses are 2+ on the right side, and 2+ on the left side.       Radial pulses are 2+ on the right side, and 2+ on the left side.        Dorsalis pedis pulses are 2+ on the right side, and 2+ on the left side.        Posterior tibial pulses are 2+ on the right side, and 2+ on the left side.   Pulmonary/Chest: Effort normal and breath sounds normal. No accessory muscle usage. No tachypnea and no bradypnea. No respiratory distress.   Abdominal: Soft. Bowel sounds are normal. He exhibits no distension and no mass. There is no hepatosplenomegaly. There is no CVA tenderness.   Musculoskeletal: Normal range of " motion. He exhibits no edema or deformity.   USES A CANE, TRACE EDEMA   Lymphadenopathy:     He has no cervical adenopathy.   Neurological: He is alert and oriented to person, place, and time. He has normal strength. He displays no tremor. No cranial nerve deficit.   Skin: Skin is warm and dry. No cyanosis or erythema. No pallor.   Psychiatric: He has a normal mood and affect. His speech is normal and behavior is normal.               ..    Chemistry        Component Value Date/Time     01/19/2018 1232    K 5.1 01/19/2018 1232     (H) 01/19/2018 1232    CO2 23 01/19/2018 1232    BUN 30 (H) 01/19/2018 1232    CREATININE 1.98 (H) 01/19/2018 1232    GLU 97 01/19/2018 1232        Component Value Date/Time    CALCIUM 9.3 01/19/2018 1232    ALKPHOS 81 01/19/2018 1232    AST 31 01/19/2018 1232    AST 28 04/09/2016 0408    ALT 28 01/19/2018 1232    BILITOT 0.4 01/19/2018 1232    ESTGFRAFRICA 33 (A) 01/19/2018 1232    EGFRNONAA 28 (A) 01/19/2018 1232            ..  Lab Results   Component Value Date    CHOL 141 09/12/2017    CHOL 131 04/09/2016     Lab Results   Component Value Date    HDL 30 (L) 09/12/2017    HDL 32 (L) 04/09/2016     Lab Results   Component Value Date    LDLCALC 93.6 09/12/2017    LDLCALC 84.0 04/09/2016     Lab Results   Component Value Date    TRIG 87 09/12/2017    TRIG 75 04/09/2016     Lab Results   Component Value Date    CHOLHDL 21.3 09/12/2017    CHOLHDL 24.4 04/09/2016     ..  Lab Results   Component Value Date    WBC 3.94 02/27/2018    HGB 10.1 (L) 02/27/2018    HCT 32.3 (L) 02/27/2018    MCV 94 02/27/2018     02/27/2018       Test(s) Reviewed  I have reviewed the following in detail:  [] Stress test   [] Angiography   [] Echocardiogram   [x] Labs   [x] Other:       Assessment:         ICD-10-CM ICD-9-CM   1. Essential hypertension I10 401.9   2. Paroxysmal atrial tachycardia I47.1 427.0   3. Nonrheumatic aortic valve stenosis I35.0 424.1   4. Coronary artery disease involving  native coronary artery of native heart without angina pectoris I25.10 414.01   5. Hypercholesteremia E78.00 272.0     Problem List Items Addressed This Visit        Cardiac/Vascular    Essential hypertension - Primary    Relevant Orders    Comprehensive metabolic panel    CAD (coronary artery disease)    Relevant Orders    Transthoracic echo (TTE) 2D with Color Flow    Comprehensive metabolic panel    Lipid panel    Paroxysmal atrial tachycardia    Relevant Orders    Comprehensive metabolic panel    Nonrheumatic aortic valve stenosis    Relevant Orders    Transthoracic echo (TTE) 2D with Color Flow    Comprehensive metabolic panel      Other Visit Diagnoses     Hypercholesteremia        Relevant Orders    Comprehensive metabolic panel    Lipid panel           Plan:     ADD PRAVACHOL, NEED STATIN SPECIALLY IN VIEW OF HISTORY OF CAD, CHECK ECHO REASSESS AORTIC VALVE, ALL CV CLINICALLY RELATIVELY STABLE, NO ANGINA, NO HF, NO TIA, NO CLINICAL ARRHYTHMIA,CONTINUE CURRENT MEDS, EDUCATION, DIET, EXERCISE, DISCUSSED PLAN WITH THE PATIENT AND WITH HIS DAUGHTER, RECOMMENDED TO  USE WALKER FOR BALANCE AND TO PREVENT MORE JOINT DAMAGE, RETURN TO CLINIC IN 6 MO WITH LABS      Essential hypertension  -     Comprehensive metabolic panel; Future; Expected date: 08/07/2019    Paroxysmal atrial tachycardia  -     Comprehensive metabolic panel; Future; Expected date: 08/07/2019    Nonrheumatic aortic valve stenosis  -     Transthoracic echo (TTE) 2D with Color Flow; Future  -     Comprehensive metabolic panel; Future; Expected date: 08/07/2019    Coronary artery disease involving native coronary artery of native heart without angina pectoris  -     Transthoracic echo (TTE) 2D with Color Flow; Future  -     Comprehensive metabolic panel; Future; Expected date: 08/07/2019  -     Lipid panel; Future; Expected date: 08/07/2019    Hypercholesteremia  -     Comprehensive metabolic panel; Future; Expected date: 08/07/2019  -     Lipid panel;  Future; Expected date: 08/07/2019    Other orders  -     Discontinue: pravastatin (PRAVACHOL) 10 MG tablet; Take 1 tablet (10 mg total) by mouth once daily.  Dispense: 90 tablet; Refill: 1  -     pravastatin (PRAVACHOL) 10 MG tablet; Take 1 tablet (10 mg total) by mouth once daily.  Dispense: 90 tablet; Refill: 1    RTC Low level/low impact aerobic exercise 5x's/wk. Heart healthy diet and risk factor modification.    See labs and med orders.    Aerobic exercise 5x's/wk. Heart healthy diet and risk factor modification.    See labs and med orders.

## 2019-08-12 NOTE — TELEPHONE ENCOUNTER
----- Message from Devan Coleman sent at 8/12/2019 10:03 AM CDT -----  Contact: Daughter Amarjit  Daughter Amarjit need to speak with a nurse regarding rx carvedilol. Please call back at [phone] (home) [phone] (work).

## 2019-08-13 RX ORDER — CARVEDILOL 12.5 MG/1
12.5 TABLET ORAL 2 TIMES DAILY
Qty: 60 TABLET | Refills: 11 | Status: ON HOLD | OUTPATIENT
Start: 2019-08-13 | End: 2019-09-04 | Stop reason: HOSPADM

## 2019-08-15 ENCOUNTER — CLINICAL SUPPORT (OUTPATIENT)
Dept: CARDIOLOGY | Facility: CLINIC | Age: 84
End: 2019-08-15
Attending: INTERNAL MEDICINE
Payer: MEDICARE

## 2019-08-15 VITALS
HEART RATE: 59 BPM | WEIGHT: 158.94 LBS | SYSTOLIC BLOOD PRESSURE: 125 MMHG | BODY MASS INDEX: 25.54 KG/M2 | DIASTOLIC BLOOD PRESSURE: 68 MMHG | HEIGHT: 66 IN

## 2019-08-15 DIAGNOSIS — I35.0 NONRHEUMATIC AORTIC VALVE STENOSIS: ICD-10-CM

## 2019-08-15 DIAGNOSIS — I25.10 CORONARY ARTERY DISEASE INVOLVING NATIVE CORONARY ARTERY OF NATIVE HEART WITHOUT ANGINA PECTORIS: ICD-10-CM

## 2019-08-15 LAB
ASCENDING AORTA: 3.01 CM
AV INDEX (PROSTH): 0.51
AV MEAN GRADIENT: 17 MMHG
AV PEAK GRADIENT: 31 MMHG
AV VALVE AREA: 1.76 CM2
AV VELOCITY RATIO: 0.53
BSA FOR ECHO PROCEDURE: 1.83 M2
CV ECHO LV RWT: 0.67 CM
DOP CALC AO PEAK VEL: 2.78 M/S
DOP CALC AO VTI: 66.57 CM
DOP CALC LVOT AREA: 3.5 CM2
DOP CALC LVOT DIAMETER: 2.1 CM
DOP CALC LVOT PEAK VEL: 1.48 M/S
DOP CALC LVOT STROKE VOLUME: 117.11 CM3
DOP CALCLVOT PEAK VEL VTI: 33.83 CM
E WAVE DECELERATION TIME: 533.44 MSEC
E/A RATIO: 0.43
E/E' RATIO: 11.25 M/S
ECHO LV POSTERIOR WALL: 1.49 CM (ref 0.6–1.1)
FRACTIONAL SHORTENING: 28 % (ref 28–44)
INTERVENTRICULAR SEPTUM: 1.72 CM (ref 0.6–1.1)
IVRT: 0.16 MSEC
LA MAJOR: 5.5 CM
LA MINOR: 6.12 CM
LA WIDTH: 2.71 CM
LEFT ATRIUM SIZE: 3.54 CM
LEFT ATRIUM VOLUME INDEX: 26 ML/M2
LEFT ATRIUM VOLUME: 47.24 CM3
LEFT INTERNAL DIMENSION IN SYSTOLE: 3.18 CM (ref 2.1–4)
LEFT VENTRICLE DIASTOLIC VOLUME INDEX: 49.35 ML/M2
LEFT VENTRICLE DIASTOLIC VOLUME: 89.52 ML
LEFT VENTRICLE MASS INDEX: 166 G/M2
LEFT VENTRICLE SYSTOLIC VOLUME INDEX: 22.3 ML/M2
LEFT VENTRICLE SYSTOLIC VOLUME: 40.48 ML
LEFT VENTRICULAR INTERNAL DIMENSION IN DIASTOLE: 4.44 CM (ref 3.5–6)
LEFT VENTRICULAR MASS: 300.26 G
LV LATERAL E/E' RATIO: 11.25 M/S
LV SEPTAL E/E' RATIO: 11.25 M/S
MV PEAK A VEL: 1.05 M/S
MV PEAK E VEL: 0.45 M/S
PISA TR MAX VEL: 2.56 M/S
PULM VEIN S/D RATIO: 1.08
PV PEAK D VEL: 0.36 M/S
PV PEAK S VEL: 0.39 M/S
PV PEAK VELOCITY: 0.88 CM/S
RA MAJOR: 4.75 CM
RA PRESSURE: 3 MMHG
RA WIDTH: 3.25 CM
RIGHT VENTRICULAR END-DIASTOLIC DIMENSION: 2.67 CM
SINUS: 2.76 CM
STJ: 2.58 CM
TDI LATERAL: 0.04 M/S
TDI SEPTAL: 0.04 M/S
TDI: 0.04 M/S
TR MAX PG: 26 MMHG
TRICUSPID ANNULAR PLANE SYSTOLIC EXCURSION: 0.89 CM
TV REST PULMONARY ARTERY PRESSURE: 29 MMHG

## 2019-08-15 PROCEDURE — 93306 TRANSTHORACIC ECHO (TTE) COMPLETE (CUPID ONLY): ICD-10-PCS | Mod: S$GLB,,, | Performed by: INTERNAL MEDICINE

## 2019-08-15 PROCEDURE — 93306 TTE W/DOPPLER COMPLETE: CPT | Mod: S$GLB,,, | Performed by: INTERNAL MEDICINE

## 2019-08-25 PROBLEM — S72.001A CLOSED RIGHT HIP FRACTURE: Status: ACTIVE | Noted: 2019-08-25
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
## 2019-08-26 PROBLEM — D69.6 THROMBOCYTOPENIA: Status: ACTIVE | Noted: 2019-08-26

## 2019-08-27 PROBLEM — R33.9 URINARY RETENTION: Status: ACTIVE | Noted: 2019-08-27

## 2019-08-28 PROBLEM — D62 ACUTE BLOOD LOSS ANEMIA: Status: ACTIVE | Noted: 2019-08-28

## 2019-08-31 PROBLEM — N17.9 AKI (ACUTE KIDNEY INJURY): Status: ACTIVE | Noted: 2019-08-31

## 2019-08-31 PROBLEM — G93.41 ENCEPHALOPATHY, METABOLIC: Status: ACTIVE | Noted: 2019-08-31

## 2019-09-06 DIAGNOSIS — S72.001D CLOSED FRACTURE OF RIGHT HIP WITH ROUTINE HEALING, SUBSEQUENT ENCOUNTER: ICD-10-CM

## 2019-09-06 DIAGNOSIS — Z96.649 S/P HIP HEMIARTHROPLASTY: Primary | ICD-10-CM

## 2019-09-10 ENCOUNTER — TELEPHONE (OUTPATIENT)
Dept: ORTHOPEDICS | Facility: CLINIC | Age: 84
End: 2019-09-10

## 2019-09-10 ENCOUNTER — HOSPITAL ENCOUNTER (OUTPATIENT)
Dept: RADIOLOGY | Facility: HOSPITAL | Age: 84
Discharge: HOME | End: 2019-09-10
Attending: ORTHOPAEDIC SURGERY
Payer: MEDICARE

## 2019-09-10 DIAGNOSIS — S72.001D CLOSED FRACTURE OF RIGHT HIP WITH ROUTINE HEALING, SUBSEQUENT ENCOUNTER: ICD-10-CM

## 2019-09-10 DIAGNOSIS — Z96.649 S/P HIP HEMIARTHROPLASTY: ICD-10-CM

## 2019-09-10 NOTE — TELEPHONE ENCOUNTER
----- Message from Anival Gaspar sent at 9/10/2019  3:13 PM CDT -----  Contact: Priya with [facility] [phone]  Type: Needs Medical Advice    Who Called:  Priya with [facility] [phone]    Additional Information:     Advised ptnt's daughter Amarjit has questions regarding the fact that the ptnt did not see the Dr at this appmnt. Also Priya needs order for a wedge pillow for the ptnt's legs should he need to continue using the pillow.  Please call Priya to discuss.

## 2019-09-10 NOTE — TELEPHONE ENCOUNTER
----- Message from Ely Cabrera sent at 9/10/2019  3:27 PM CDT -----  Contact: daughter Amarjit Hubbard  zana sent       Patient daughter requesting to speak with nurse regarding rehab orders for patient       Please call to advice .[phone] (home) [phone] (work)

## 2019-09-11 NOTE — TELEPHONE ENCOUNTER
Returned called to Amarjit (patients daughter), regarding her fathers care orders.  Explained why we could not get xrays at our facility today and what Dr. Faulkner's plan of care was for us to see the patient again. Explained xrays would be preformed at the facility he is at currently because he is transfers only with lift.  Instructed to bring images to the next office visit for Dr. Faulkner to review.  She then proceeded to express her frustrations about the care her father received while he was at Dr. Germain's clinic.  Her father had a bowel movement while waiting to see Dr. Faulkner.  Patient was brought back to a room where the nurse said she would go get someone to help clean him up to help move him.  She was upset because no one cleaned her father and he was embarrassed. I explain we do not usually clean up the patients in that matter, we will care for the orthopedic wounds etc. But everything else is usually cared for by the caregivers and aids that come to the office visit. She was upset and I asked if she would like to discuss this matter with the . She wishes to discuss this with our . Will notify Debra Mcclain of the situation.

## 2019-09-11 NOTE — TELEPHONE ENCOUNTER
Returned nurses call regarding patients orders from Dr. Faulkner.  Per Dr. Faulkner, wedge pillow to be in place at all times except during therapy sessions. Also, instructed to send xray images for next office visit. Nurse verbalized understanding.

## 2019-09-13 ENCOUNTER — TELEPHONE (OUTPATIENT)
Dept: CARDIOLOGY | Facility: CLINIC | Age: 84
End: 2019-09-13

## 2019-09-13 NOTE — TELEPHONE ENCOUNTER
----- Message from Yana Lezama sent at 9/13/2019 11:53 AM CDT -----  Contact: pts daughter  Type: Needs Medical Advice    Who Called:  Pt's Daughter  Symptoms (please be specific):  Daughter refused to give symptoms other then she thinks he had a stroke  How long has patient had these symptoms:  n/a  Best Call Back Number: [phone]  Additional Information: Daughter refused to adv further just wanted to speak to dr. davila'd she needed to give info and she hung up

## 2019-09-13 NOTE — TELEPHONE ENCOUNTER
Please advise: I spoke with pts daughter Ro. She is asking for a call back regarding her dad who she believes had a stroke. He is a pt of Dr Montes De Oca however he saw you on 8/7/19/

## 2019-09-13 NOTE — TELEPHONE ENCOUNTER
----- Message from Ekaterina Gilmore sent at 9/13/2019 11:57 AM CDT -----  Type: Needs Medical Advice    Who Called:  Daughter -Ro Nelson  Symptoms (please be specific):   How long has patient had these symptoms:   Pharmacy name and phone #:    Best Call Back Number: [phone]  Additional Information: Patient is currently in a rehab facility for recent hip surgery. Patient's daughter requesting the doctor of an associate to visit the rehab facility in [facility]. Patient's daughter think her dad may have had a stroke since the pt has been admitted into the facility.

## 2019-09-17 ENCOUNTER — OFFICE VISIT (OUTPATIENT)
Dept: ORTHOPEDICS | Facility: CLINIC | Age: 84
End: 2019-09-17
Payer: MEDICARE

## 2019-09-17 VITALS
HEART RATE: 63 BPM | DIASTOLIC BLOOD PRESSURE: 80 MMHG | WEIGHT: 158 LBS | BODY MASS INDEX: 25.39 KG/M2 | SYSTOLIC BLOOD PRESSURE: 210 MMHG | HEIGHT: 66 IN

## 2019-09-17 DIAGNOSIS — S72.001D CLOSED FRACTURE OF RIGHT HIP WITH ROUTINE HEALING, SUBSEQUENT ENCOUNTER: Primary | ICD-10-CM

## 2019-09-17 PROCEDURE — 99999 PR PBB SHADOW E&M-EST. PATIENT-LVL III: CPT | Mod: PBBFAC,,, | Performed by: ORTHOPAEDIC SURGERY

## 2019-09-17 PROCEDURE — 99024 POSTOP FOLLOW-UP VISIT: CPT | Mod: POP,,, | Performed by: ORTHOPAEDIC SURGERY

## 2019-09-17 PROCEDURE — 99999 PR PBB SHADOW E&M-EST. PATIENT-LVL III: ICD-10-PCS | Mod: PBBFAC,,, | Performed by: ORTHOPAEDIC SURGERY

## 2019-09-17 PROCEDURE — 99213 OFFICE O/P EST LOW 20 MIN: CPT | Mod: PBBFAC,PN | Performed by: ORTHOPAEDIC SURGERY

## 2019-09-17 PROCEDURE — 99024 PR POST-OP FOLLOW-UP VISIT: ICD-10-PCS | Mod: POP,,, | Performed by: ORTHOPAEDIC SURGERY

## 2019-09-17 RX ORDER — OMEGA-3-ACID ETHYL ESTERS 1 G/1
CAPSULE, LIQUID FILLED ORAL
Refills: 5 | Status: ON HOLD | COMMUNITY
Start: 2019-09-11 | End: 2019-11-20 | Stop reason: HOSPADM

## 2019-09-17 NOTE — PROGRESS NOTES
Chief Complaint   Patient presents with    Right Hip - Post-op Evaluation     Rt thania hip 8/26/19       HPI:  89 y.o. male returns to clinic today status post right hip hemiarthroplasty 3 weeks ago. Pain is moderate. Patient is compliant most of the time with restrictions.     right Hip Exam   Tenderness   The patient is experiencing no tenderness.   Incision healed    Range of Motion   The patient has normal right hip ROM.    Muscle Strength   Flexion: 2/5     Other   Erythema: absent  Sensation: normal  Pulse: present    X-rays were performed, personally reviewed by me and findings discussed with the patient.  2 views of the right hip show implants intact with no evidence of loosening    There are no diagnoses linked to this encounter.    Abduction pillow at all times.  Strict posterior hip precautions. Daily PT for gait training. Repeat Xrays at facility prior to next visit. RTC 6 weeks with repeat Xrays.

## 2019-09-30 ENCOUNTER — TELEPHONE (OUTPATIENT)
Dept: CARDIOLOGY | Facility: CLINIC | Age: 84
End: 2019-09-30

## 2019-09-30 NOTE — TELEPHONE ENCOUNTER
----- Message from Karie Shin sent at 9/30/2019  8:05 AM CDT -----  Type: Needs Medical Advice    Who Called:  Daughter - Amarjit Cárdenas  Symptoms (please be specific):  Broken hip  How long has patient had these symptoms:  heber  Pharmacy name and phone #:  [facility]  Best Call Back Number: [phone]  Additional Information: appt tomorrow 10 01 19 with Dr Montes De Oca but patient is in rehab at [facility] in [place]/daughter is wanting to schedule with Dr Scales instead in [place] because it is very painful for patient to travel far/I do not show any appt soon/daughter wanted appt sent to Dr Montes De Oca since his appt tomorrow is with him

## 2019-09-30 NOTE — TELEPHONE ENCOUNTER
Spoke to Amarjit Cárdenas informed her pt. Is not due for an appointment and has next appointment is feb. 19 2020 at 10:15 a.m. Amarjit. Verbalized understanding and requested to contact [facility] at [phone] and update them on pt. Appointments. Spoke to meghann at [facility] informed her of appointment changed.

## 2019-11-01 ENCOUNTER — TELEPHONE (OUTPATIENT)
Dept: ORTHOPEDICS | Facility: CLINIC | Age: 84
End: 2019-11-01

## 2019-11-01 NOTE — TELEPHONE ENCOUNTER
Tried calling Dr. Christina back. No answer and voicemail not set up. Per Ms. Florence, APRN, pt has strict posterior hip precautions. He is to use the abduction pillow while in bed. Thanks, Janie

## 2019-11-01 NOTE — TELEPHONE ENCOUNTER
Called for Dr. Christina again. Spoke to him this time. Advised of ARETHA Doan's recommendations. Thanks, Janie

## 2019-11-01 NOTE — TELEPHONE ENCOUNTER
----- Message from Steven Ordaz MA sent at 11/1/2019  2:36 PM CDT -----  Contact: Dr. Christina  Patient has foam block between his legs, can this be removed or when  Call back

## 2019-11-12 PROBLEM — N17.9 ACUTE KIDNEY INJURY: Status: ACTIVE | Noted: 2019-11-12

## 2019-11-12 PROBLEM — Z78.9 FULL CODE STATUS: Status: ACTIVE | Noted: 2019-11-12

## 2019-11-12 PROBLEM — N30.00 ACUTE CYSTITIS WITHOUT HEMATURIA: Status: ACTIVE | Noted: 2019-11-12

## 2019-11-13 ENCOUNTER — TELEPHONE (OUTPATIENT)
Dept: ORTHOPEDICS | Facility: CLINIC | Age: 84
End: 2019-11-13

## 2019-11-13 PROBLEM — N18.4 ACUTE RENAL FAILURE SUPERIMPOSED ON STAGE 4 CHRONIC KIDNEY DISEASE: Status: ACTIVE | Noted: 2019-11-12

## 2019-11-13 NOTE — TELEPHONE ENCOUNTER
----- Message from Niyah Galan MA sent at 11/13/2019 10:19 AM CST -----  Contact: daughter Amarjit Ventura   Had a post op appt 11/14/2019, still in patient, will bring [facility] xray  [phone]

## 2019-11-13 NOTE — TELEPHONE ENCOUNTER
Patient's daughter called to notify that her father was admitted to [facility] last night and had to cancel his post op. Will notify Elba Florence Np that he is at the hospital. Daughter stated understanding.

## 2019-11-15 PROBLEM — Z78.9 FULL CODE STATUS: Status: RESOLVED | Noted: 2019-11-12 | Resolved: 2019-11-15

## 2020-01-07 ENCOUNTER — EXTERNAL HOME HEALTH (OUTPATIENT)
Dept: HOME HEALTH SERVICES | Facility: HOSPITAL | Age: 85
End: 2020-01-07

## 2021-10-29 NOTE — TELEPHONE ENCOUNTER
----- Message from Joan Sandhu sent at 1/4/2019 12:50 PM CST -----  Contact: Amarjit-daughter  Type: Needs Medical Advice    Who Called:  Amarjit-daughter  Best Call Back Number: [phone]  Additional Information: Daughter called in missed a call would like a all back.      Addended by: CHEYANNE BRADLEY on: 10/29/2021 08:23 AM     Modules accepted: Orders

## 2022-06-02 NOTE — TELEPHONE ENCOUNTER
----- Message from Lawanda Efraín sent at 6/28/2018 12:40 PM CDT -----  Contact: Dede turcios/ [facility]  Type: Needs Medical Advice    Who Called: Dede turcios/ [facility]  Symptoms (please be specific):  NA  How long has patient had these symptoms:  KWABENA  Pharmacy name and phone #:  NA  Best Call Back Number: Dede at , ext [phone]  Additional Information: Calling to speak with the Nurse. She wants to clarify some medication. The patient is in the office now. Please advise. Call to pod. No answer.     
Called office no answer  Called patient number left message  
Brain/Spinal implant